# Patient Record
(demographics unavailable — no encounter records)

---

## 2024-11-24 NOTE — HISTORY OF PRESENT ILLNESS
[FreeTextEntry1] : here for pelvic pain  also complains of urinary frequency mostly at night  UA neg

## 2024-11-24 NOTE — PHYSICAL EXAM
[Examination Of The Breasts] : a normal appearance [No Masses] : no breast masses were palpable [Labia Majora] : normal [Labia Minora] : normal [Motion Tenderness] : motion tenderness [Normal] : normal [Uterine Adnexae] : normal